# Patient Record
Sex: FEMALE | URBAN - METROPOLITAN AREA
[De-identification: names, ages, dates, MRNs, and addresses within clinical notes are randomized per-mention and may not be internally consistent; named-entity substitution may affect disease eponyms.]

---

## 2018-03-07 ENCOUNTER — IMPORTED ENCOUNTER (OUTPATIENT)
Dept: URBAN - METROPOLITAN AREA CLINIC 9 | Facility: CLINIC | Age: 53
End: 2018-03-07

## 2020-11-11 NOTE — PATIENT DISCUSSION
From: Marialuisa Liao  Sent: 7/29/2018 12:18 PM CDT  Subject: Medication Renewal Request    Marialuisa Liao would like a refill of the following medications:     TraMADol HCl (ULTRAM) 50 MG Oral Tab Kesha Becerra MD]   Patient Comment: Please send to p Posterior Vitreous Detachment: I have discussed the diagnosis of PVD with the patient and the possibility of a retinal tear or detachment. The signs/symptoms of a retinal tear/detachment were reviewed to include but not limited to redness, discharge, pain, increase or change in flashes of light, increase or change in floaters, decreased vision, part of the vision missing, veils or any other ocular concerns. I have further explained not all patients who develop a tear or detachment have notable symptoms, therefore, compliance with return visits are necessary. The patient was instructed to contact us immediately if they noticed any of the signs or symptoms of retinal detachment as noted above as the prognosis for any potential treatment options may be time related. Return for follow-up as scheduled.

## 2020-11-11 NOTE — PATIENT DISCUSSION
NONSPECIFIC MACULAR DRUSEN / RPE CHANGES, OS: NO DEFINITE SIGNS OF AGE RELATED MACULAR DEGENERATION. NO TREATMENT INDICATED AT THIS TIME. RETURN AS SCHEDULED FOR YEARLY DILATED FUNDUS EXAM WITH DR SIDHU.

## 2021-10-08 NOTE — PATIENT DISCUSSION
Dry eye syndrome treatment options discussed with patient (increase artificial tear use, Restasis, punctal plugs, and omega3/fatty acid capsules).

## 2021-10-16 ASSESSMENT — VISUAL ACUITY
OD_SC: CF 2FT SN
OS_CC: 20/25 SN
OD_CC: 20/25 SN
OS_SC: CF 2FT SN

## 2021-10-16 ASSESSMENT — KERATOMETRY
OD_AXISANGLE_DEGREES: 83
OD_K1POWER_DIOPTERS: 44
OD_AXISANGLE2_DEGREES: 173
OS_K1POWER_DIOPTERS: 43.75
OS_AXISANGLE2_DEGREES: 175
OS_K2POWER_DIOPTERS: 44.5
OS_AXISANGLE_DEGREES: 85
OD_K2POWER_DIOPTERS: 44.25

## 2022-11-11 NOTE — PATIENT DISCUSSION
[facility] monitoring, AREDS2 vitamins, no smoking, green leafy vegetables discussed.